# Patient Record
Sex: MALE | URBAN - METROPOLITAN AREA
[De-identification: names, ages, dates, MRNs, and addresses within clinical notes are randomized per-mention and may not be internally consistent; named-entity substitution may affect disease eponyms.]

---

## 2023-06-19 ENCOUNTER — OFFICE VISIT (OUTPATIENT)
Dept: SLEEP MEDICINE | Facility: CLINIC | Age: 68
End: 2023-06-19
Payer: COMMERCIAL

## 2023-06-19 ENCOUNTER — TELEPHONE (OUTPATIENT)
Dept: SLEEP MEDICINE | Facility: CLINIC | Age: 68
End: 2023-06-19

## 2023-06-19 VITALS
HEIGHT: 70 IN | BODY MASS INDEX: 29.95 KG/M2 | DIASTOLIC BLOOD PRESSURE: 88 MMHG | OXYGEN SATURATION: 98 % | SYSTOLIC BLOOD PRESSURE: 136 MMHG | HEART RATE: 67 BPM | WEIGHT: 209.2 LBS

## 2023-06-19 DIAGNOSIS — G47.9 SLEEP DISORDER: Primary | ICD-10-CM

## 2023-06-19 PROCEDURE — 99204 OFFICE O/P NEW MOD 45 MIN: CPT | Performed by: PSYCHIATRY & NEUROLOGY

## 2023-06-19 NOTE — NURSING NOTE
"Chief Complaint   Patient presents with     Sleep Problem      Questioning Parkinson        Initial /88   Pulse 67   Ht 1.765 m (5' 9.5\")   Wt 94.9 kg (209 lb 3.2 oz)   SpO2 98%   BMI 30.45 kg/m   Estimated body mass index is 30.45 kg/m  as calculated from the following:    Height as of this encounter: 1.765 m (5' 9.5\").    Weight as of this encounter: 94.9 kg (209 lb 3.2 oz).    Medication Reconciliation: complete  Arleth hewitt MA        "

## 2023-06-19 NOTE — TELEPHONE ENCOUNTER
General Call      Reason for Call:  Questions    What are your questions or concerns:  Pt stated he is thrill its not Parkinson's, but has one more question. He is wondering if you would know what it is that is causing his symptoms. Would like a call back.     Date of last appointment with provider: 9/19/23    Okay to leave a detailed message?: [facility] Home number on file [phone] (home)